# Patient Record
Sex: FEMALE | Race: WHITE | NOT HISPANIC OR LATINO | Employment: UNEMPLOYED | URBAN - METROPOLITAN AREA
[De-identification: names, ages, dates, MRNs, and addresses within clinical notes are randomized per-mention and may not be internally consistent; named-entity substitution may affect disease eponyms.]

---

## 2023-09-17 ENCOUNTER — OFFICE VISIT (OUTPATIENT)
Dept: URGENT CARE | Facility: CLINIC | Age: 23
End: 2023-09-17
Payer: COMMERCIAL

## 2023-09-17 VITALS
TEMPERATURE: 97.2 F | HEIGHT: 63 IN | SYSTOLIC BLOOD PRESSURE: 118 MMHG | RESPIRATION RATE: 15 BRPM | HEART RATE: 99 BPM | BODY MASS INDEX: 26.58 KG/M2 | WEIGHT: 150 LBS | OXYGEN SATURATION: 99 % | DIASTOLIC BLOOD PRESSURE: 68 MMHG

## 2023-09-17 DIAGNOSIS — H65.01 RIGHT ACUTE SEROUS OTITIS MEDIA, RECURRENCE NOT SPECIFIED: ICD-10-CM

## 2023-09-17 DIAGNOSIS — H60.331 ACUTE SWIMMER'S EAR OF RIGHT SIDE: Primary | ICD-10-CM

## 2023-09-17 PROCEDURE — G0382 LEV 3 HOSP TYPE B ED VISIT: HCPCS | Performed by: NURSE PRACTITIONER

## 2023-09-17 RX ORDER — CIPROFLOXACIN AND DEXAMETHASONE 3; 1 MG/ML; MG/ML
4 SUSPENSION/ DROPS AURICULAR (OTIC) 2 TIMES DAILY
Qty: 7.5 ML | Refills: 0 | Status: SHIPPED | OUTPATIENT
Start: 2023-09-17

## 2023-09-17 RX ORDER — RIMEGEPANT SULFATE 75 MG/75MG
TABLET, ORALLY DISINTEGRATING ORAL
COMMUNITY
Start: 2023-08-16

## 2023-09-17 NOTE — PROGRESS NOTES
[facility] Now        NAME: Radha Paul is a 25 y.o. female  : 2000    MRN: [id number]  DATE: 2023  TIME: 3:13 PM    Assessment and Plan   Acute swimmer's ear of right side [H60.331]  1. Acute swimmer's ear of right side  ciprofloxacin-dexamethasone (CIPRODEX) otic suspension      2. Right acute serous otitis media, recurrence not specified  Ambulatory Referral to Otolaryngology            Patient Instructions     --Instill Rx drops into right ear as prescribed x 5-7 days  --Keep water out of ear  --OTC nasal steroid (Flonase, Nasocort) 2 sprays/nostril daily x 3-5 days  --Motrin as needed  --Follow-up with ENT for ongoing/recurrent issues. Should be seen sooner for any worsening (ER if needed). Chief Complaint     Chief Complaint   Patient presents with   • Earache     Pt was already treated for ear infection by 2 different ab's pt is not sure what is happening she reports it feels like she is having a spasm in her right ear. History of Present Illness       Here with complaints of right ear discomfort x 3 weeks. Feels like "spasm", "fluid" in ear. Mild itching. Hearing normal.  Some initial pain in right upper tooth also. Went to PCP near start of symptoms and diagnosed with middle ear infection. Placed on Z-pack, no drops. Partial improvement. Additional antibiotic (Ceftin) given which she completed. Tooth pain resolved, but right ear still bothering her. No swelling noted. No fever/chills, URI symptoms. Initial headache now better. Has appointment with dentist in a couple days. Review of Systems   Review of Systems   Constitutional: Negative for fever. HENT: Positive for ear pain. Negative for ear discharge, facial swelling, hearing loss, rhinorrhea, sinus pressure and sinus pain. Respiratory: Negative for cough. Neurological: Positive for headaches.          Current Medications       Current Outpatient Medications:   • ciprofloxacin-dexamethasone (CIPRODEX) otic suspension, Administer 4 drops to the right ear 2 (two) times a day, Disp: 7.5 mL, Rfl: 0  •  Nurtec 75 MG TBDP, PLACE 1 TABLET BY TRANSLINGUAL ROUTE ON TOP OF TONGUE, ALLOW TO DISSOLVE THEN SWALLOW ONCE AS NEEDED FOR MIGRAINE; MAX 1 DOSE/24 HRS, Disp: , Rfl:     Current Allergies     Allergies as of 09/17/2023 - Reviewed 09/17/2023   Allergen Reaction Noted   • Amoxicillin Hives 09/17/2023   • Cephalosporins Hives 09/17/2023            The following portions of the patient's history were reviewed and updated as appropriate: allergies, current medications, past family history, past medical history, past social history, past surgical history and problem list.     No past medical history on file. No past surgical history on file. No family history on file. Medications have been verified. Objective   /68   Pulse 99   Temp (!) 97.2 °F (36.2 °C)   Resp 15   Ht 5' 3" (1.6 m)   Wt 68 kg (150 lb)   SpO2 99%   BMI 26.57 kg/m²   No LMP recorded. Physical Exam     Physical Exam  Constitutional:       General: She is not in acute distress. Appearance: She is not diaphoretic. HENT:      Head: Normocephalic. Right Ear: There is no impacted cerumen. Left Ear: Tympanic membrane, ear canal and external ear normal. There is no impacted cerumen. Ears:      Comments: Right canal with mild erythema, swelling, no otorrhea. TM dull grey, with mild bulge. Intact. No tragus, mastoid, pinna  tenderness. Nose: No congestion or rhinorrhea. Comments: No TMJ or sinus tenderness. Mouth/Throat:      Pharynx: No posterior oropharyngeal erythema. Comments: Teeth and gums, non-tender with normal appearance externally. Pulmonary:      Effort: Pulmonary effort is normal.   Neurological:      Mental Status: She is alert.    Psychiatric:         Mood and Affect: Mood normal.

## 2023-09-17 NOTE — PATIENT INSTRUCTIONS
--Instill Rx drops into right ear as prescribed x 5-7 days  --Keep water out of ear  --OTC nasal steroid (Flonase, Nasocort) 2 sprays/nostril daily x 3-5 days  --Motrin as needed  --Follow-up with ENT for ongoing/recurrent issues. Should be seen sooner for any worsening (ER if needed).

## 2023-11-17 ENCOUNTER — OFFICE VISIT (OUTPATIENT)
Dept: URGENT CARE | Facility: CLINIC | Age: 23
End: 2023-11-17
Payer: COMMERCIAL

## 2023-11-17 VITALS
OXYGEN SATURATION: 99 % | SYSTOLIC BLOOD PRESSURE: 128 MMHG | HEART RATE: 102 BPM | TEMPERATURE: 98.8 F | HEIGHT: 64 IN | DIASTOLIC BLOOD PRESSURE: 81 MMHG | BODY MASS INDEX: 27.66 KG/M2 | RESPIRATION RATE: 18 BRPM | WEIGHT: 162 LBS

## 2023-11-17 DIAGNOSIS — J20.9 ACUTE BRONCHITIS, UNSPECIFIED ORGANISM: Primary | ICD-10-CM

## 2023-11-17 PROCEDURE — G0382 LEV 3 HOSP TYPE B ED VISIT: HCPCS | Performed by: NURSE PRACTITIONER

## 2023-11-17 RX ORDER — AZITHROMYCIN 250 MG/1
TABLET, FILM COATED ORAL
Qty: 6 TABLET | Refills: 0 | Status: SHIPPED | OUTPATIENT
Start: 2023-11-17 | End: 2023-11-22

## 2023-11-17 NOTE — LETTER
November 17, 2023     Patient: Holden Alejandra   YOB: 2000   Date of Visit: 11/17/2023       To Whom it May Concern:    Rita Leone was seen in my clinic on 11/17/2023. Please excuse from work today due to illness and doctor's visit. If you have any questions or concerns, please don't hesitate to call.          Sincerely,          AGATA Valente        CC: No Recipients

## 2023-11-17 NOTE — PROGRESS NOTES
[facility] Now        NAME: Radha Paul is a 26 y.o. female  : 2000    MRN: [id number]  DATE: 2023  TIME: 11:30 AM    Assessment and Plan   Acute bronchitis, unspecified organism [J20.9]  1. Acute bronchitis, unspecified organism  azithromycin (ZITHROMAX) 250 mg tablet            Patient Instructions     --Rest, drink plenty of fluids  --Fill and start antibiotic only if no improvement/worsening symptoms over the next 48-72 hours despite other measures below  --Consider vitamin C, zinc, quercetin, and vitamin D to help strengthen your immune system  --For cough, you can take an OTC expectorant such as plain Robitussion or Mucinex (active ingredient guaifenesin). A spoonful of honey at bedtime may also be helpful, as may a prescription cough medicine. Also recommended is the use of a cool mist humidifier (with or without Vicks) in the bedroom at night. --For sore throat, you can take OTC lozenges, use warm gargles (salt water or apple cider vinegar and honey), herbal teas, or an OTC throat spray (Chloraseptic). --For nasal/sinus congestion, helpful measures include steam, warm compresses, an OTC saline nasal spray or Neti pot, or an OTC decongestant (such as Sudafed). The decongestant should be avoided, however, if you are under 10years of age, or have a history of high blood pressure or heart disease. In addition, an OTC nasal steroid (Flonase, Nasocort) or nasal decongestant (Afrin, Jhonatan-synephrine) may be taken. The nasal steroid should be used at bedtime, after the saline nasal spray. The nasal decongestant should not be taken more than 3 days consecutively in order to prevent rebound congestion. --For nasal drainage, postnasal drip, sneezing and itching, an OTC antihistamine (Allegra, Benadryl, etc) can be taken. --You can take Tylenol or Motrin/Advil as needed for fever, headache, body aches.  Motrin/Advil should be avoided, however, if you have a history of heart disease, bleeding ulcers, or if you take blood thinners. --You should contact your primary care provider and/or go to the ER if your symptoms are not improved or get worse over the next 7 days. This includes new onset fever, localized ear pain, sinus pain, as well as worsening cough, chest pain, shortness of breath, or significant weakness/fatigue. Chief Complaint     Chief Complaint   Patient presents with    Cold Like Symptoms     URI s/s x 1.5 weeks           History of Present Illness       Here with complaints of nasal congestion, scant rhinorrhea, cough productive of green sputum x 10 days. Not getting better, worse if anything. No sore throat, but glands feel tender at times. Ear pressure. No fever. Some headaches. No body aches. Nausea at times, but no vomiting, diarrhea. No dyspnea. Taking Dayquil and Nyquil. No COVID concerns. Denies pregnancy        Review of Systems   Review of Systems   Constitutional:  Negative for fever. HENT:  Positive for rhinorrhea. Negative for sore throat. Respiratory:  Positive for cough. Negative for shortness of breath and wheezing. Gastrointestinal:  Positive for nausea. Negative for abdominal pain and vomiting. Musculoskeletal:  Negative for myalgias.          Current Medications       Current Outpatient Medications:     azithromycin (ZITHROMAX) 250 mg tablet, Take 2 tablets today then 1 tablet daily x 4 days, Disp: 6 tablet, Rfl: 0    ciprofloxacin-dexamethasone (CIPRODEX) otic suspension, Administer 4 drops to the right ear 2 (two) times a day, Disp: 7.5 mL, Rfl: 0    Nurtec 75 MG TBDP, PLACE 1 TABLET BY TRANSLINGUAL ROUTE ON TOP OF TONGUE, ALLOW TO DISSOLVE THEN SWALLOW ONCE AS NEEDED FOR MIGRAINE; MAX 1 DOSE/24 HRS, Disp: , Rfl:     Current Allergies     Allergies as of 11/17/2023 - Reviewed 11/17/2023   Allergen Reaction Noted    Amoxicillin Hives 09/17/2023    Cephalosporins Hives 09/17/2023            The following portions of the patient's history were reviewed and updated as appropriate: allergies, current medications, past family history, past medical history, past social history, past surgical history and problem list.     History reviewed. No pertinent past medical history. History reviewed. No pertinent surgical history. History reviewed. No pertinent family history. Medications have been verified. Objective   /81   Pulse 102   Temp 98.8 °F (37.1 °C)   Resp 18   Ht 5' 4" (1.626 m)   Wt 73.5 kg (162 lb)   SpO2 99%   BMI 27.81 kg/m²   No LMP recorded. Physical Exam     Physical Exam  Constitutional:       General: She is not in acute distress. Appearance: Normal appearance. She is well-developed. She is not ill-appearing, toxic-appearing or diaphoretic. HENT:      Head: Normocephalic. Right Ear: Tympanic membrane, ear canal and external ear normal.      Left Ear: Tympanic membrane, ear canal and external ear normal.      Nose: Congestion and rhinorrhea present. Comments: No sinus tenderness. Mouth/Throat:      Pharynx: No oropharyngeal exudate or posterior oropharyngeal erythema. Eyes:      General:         Right eye: No discharge. Left eye: No discharge. Cardiovascular:      Rate and Rhythm: Normal rate and regular rhythm. Heart sounds: Normal heart sounds. No murmur heard. Pulmonary:      Effort: Pulmonary effort is normal. No respiratory distress. Breath sounds: Normal breath sounds. No stridor. No wheezing, rhonchi or rales. Chest:      Chest wall: No tenderness. Abdominal:      Tenderness: There is no abdominal tenderness. Musculoskeletal:      Cervical back: Neck supple. Lymphadenopathy:      Cervical: No cervical adenopathy. Skin:     General: Skin is warm and dry. Neurological:      Mental Status: She is alert and oriented to person, place, and time. Deep Tendon Reflexes: Reflexes are normal and symmetric.    Psychiatric: Mood and Affect: Mood normal.

## 2023-11-17 NOTE — PATIENT INSTRUCTIONS
--Rest, drink plenty of fluids  --Fill and start antibiotic only if no improvement/worsening symptoms over the next 48-72 hours despite other measures below  --Consider vitamin C, zinc, quercetin, and vitamin D to help strengthen your immune system  --For cough, you can take an OTC expectorant such as plain Robitussion or Mucinex (active ingredient guaifenesin). A spoonful of honey at bedtime may also be helpful, as may a prescription cough medicine. Also recommended is the use of a cool mist humidifier (with or without Vicks) in the bedroom at night. --For sore throat, you can take OTC lozenges, use warm gargles (salt water or apple cider vinegar and honey), herbal teas, or an OTC throat spray (Chloraseptic). --For nasal/sinus congestion, helpful measures include steam, warm compresses, an OTC saline nasal spray or Neti pot, or an OTC decongestant (such as Sudafed). The decongestant should be avoided, however, if you are under 10years of age, or have a history of high blood pressure or heart disease. In addition, an OTC nasal steroid (Flonase, Nasocort) or nasal decongestant (Afrin, Jhonatan-synephrine) may be taken. The nasal steroid should be used at bedtime, after the saline nasal spray. The nasal decongestant should not be taken more than 3 days consecutively in order to prevent rebound congestion. --For nasal drainage, postnasal drip, sneezing and itching, an OTC antihistamine (Allegra, Benadryl, etc) can be taken. --You can take Tylenol or Motrin/Advil as needed for fever, headache, body aches. Motrin/Advil should be avoided, however, if you have a history of heart disease, bleeding ulcers, or if you take blood thinners. --You should contact your primary care provider and/or go to the ER if your symptoms are not improved or get worse over the next 7 days.   This includes new onset fever, localized ear pain, sinus pain, as well as worsening cough, chest pain, shortness of breath, or significant weakness/fatigue.

## 2025-01-31 ENCOUNTER — OFFICE VISIT (OUTPATIENT)
Dept: OBGYN CLINIC | Facility: CLINIC | Age: 25
End: 2025-01-31
Payer: COMMERCIAL

## 2025-01-31 ENCOUNTER — APPOINTMENT (OUTPATIENT)
Dept: LAB | Facility: CLINIC | Age: 25
End: 2025-01-31
Payer: COMMERCIAL

## 2025-01-31 VITALS
WEIGHT: 168 LBS | HEIGHT: 64 IN | SYSTOLIC BLOOD PRESSURE: 112 MMHG | DIASTOLIC BLOOD PRESSURE: 80 MMHG | BODY MASS INDEX: 28.68 KG/M2

## 2025-01-31 DIAGNOSIS — Z12.4 SCREENING FOR MALIGNANT NEOPLASM OF CERVIX: ICD-10-CM

## 2025-01-31 DIAGNOSIS — Z87.42 HISTORY OF OVARIAN CYST: ICD-10-CM

## 2025-01-31 DIAGNOSIS — Z12.4 CERVICAL CANCER SCREENING: ICD-10-CM

## 2025-01-31 DIAGNOSIS — Z01.419 ENCOUNTER FOR WELL WOMAN EXAM: Primary | ICD-10-CM

## 2025-01-31 DIAGNOSIS — Z01.419 WELL WOMAN EXAM WITH ROUTINE GYNECOLOGICAL EXAM: ICD-10-CM

## 2025-01-31 DIAGNOSIS — Z12.39 ENCOUNTER FOR SCREENING BREAST EXAMINATION: ICD-10-CM

## 2025-01-31 DIAGNOSIS — N92.6 IRREGULAR BLEEDING: ICD-10-CM

## 2025-01-31 LAB
ALBUMIN SERPL BCG-MCNC: 4.2 G/DL (ref 3.5–5)
ALP SERPL-CCNC: 71 U/L (ref 34–104)
ALT SERPL W P-5'-P-CCNC: 31 U/L (ref 7–52)
ANION GAP SERPL CALCULATED.3IONS-SCNC: 5 MMOL/L (ref 4–13)
AST SERPL W P-5'-P-CCNC: 25 U/L (ref 13–39)
B-HCG SERPL-ACNC: <0.6 MIU/ML (ref 0–5)
BASOPHILS # BLD AUTO: 0.03 THOUSANDS/ΜL (ref 0–0.1)
BASOPHILS NFR BLD AUTO: 0 % (ref 0–1)
BILIRUB SERPL-MCNC: 1.17 MG/DL (ref 0.2–1)
BUN SERPL-MCNC: 15 MG/DL (ref 5–25)
CALCIUM SERPL-MCNC: 9.4 MG/DL (ref 8.4–10.2)
CHLORIDE SERPL-SCNC: 102 MMOL/L (ref 96–108)
CO2 SERPL-SCNC: 29 MMOL/L (ref 21–32)
CREAT SERPL-MCNC: 0.7 MG/DL (ref 0.6–1.3)
EOSINOPHIL # BLD AUTO: 0.13 THOUSAND/ΜL (ref 0–0.61)
EOSINOPHIL NFR BLD AUTO: 2 % (ref 0–6)
ERYTHROCYTE [DISTWIDTH] IN BLOOD BY AUTOMATED COUNT: 12.1 % (ref 11.6–15.1)
FSH SERPL-ACNC: 6.7 MIU/ML
GFR SERPL CREATININE-BSD FRML MDRD: 122 ML/MIN/1.73SQ M
GLUCOSE P FAST SERPL-MCNC: 87 MG/DL (ref 65–99)
HCT VFR BLD AUTO: 40.8 % (ref 34.8–46.1)
HGB BLD-MCNC: 13.4 G/DL (ref 11.5–15.4)
IMM GRANULOCYTES # BLD AUTO: 0.02 THOUSAND/UL (ref 0–0.2)
IMM GRANULOCYTES NFR BLD AUTO: 0 % (ref 0–2)
LH SERPL-ACNC: 4.6 MIU/ML
LYMPHOCYTES # BLD AUTO: 2.77 THOUSANDS/ΜL (ref 0.6–4.47)
LYMPHOCYTES NFR BLD AUTO: 37 % (ref 14–44)
MCH RBC QN AUTO: 30 PG (ref 26.8–34.3)
MCHC RBC AUTO-ENTMCNC: 32.8 G/DL (ref 31.4–37.4)
MCV RBC AUTO: 92 FL (ref 82–98)
MONOCYTES # BLD AUTO: 0.88 THOUSAND/ΜL (ref 0.17–1.22)
MONOCYTES NFR BLD AUTO: 12 % (ref 4–12)
NEUTROPHILS # BLD AUTO: 3.76 THOUSANDS/ΜL (ref 1.85–7.62)
NEUTS SEG NFR BLD AUTO: 49 % (ref 43–75)
NRBC BLD AUTO-RTO: 0 /100 WBCS
PLATELET # BLD AUTO: 348 THOUSANDS/UL (ref 149–390)
PMV BLD AUTO: 10 FL (ref 8.9–12.7)
POTASSIUM SERPL-SCNC: 4 MMOL/L (ref 3.5–5.3)
PROT SERPL-MCNC: 6.6 G/DL (ref 6.4–8.4)
RBC # BLD AUTO: 4.46 MILLION/UL (ref 3.81–5.12)
SODIUM SERPL-SCNC: 136 MMOL/L (ref 135–147)
TSH SERPL DL<=0.05 MIU/L-ACNC: 0.96 UIU/ML (ref 0.45–4.5)
WBC # BLD AUTO: 7.59 THOUSAND/UL (ref 4.31–10.16)

## 2025-01-31 PROCEDURE — 83001 ASSAY OF GONADOTROPIN (FSH): CPT

## 2025-01-31 PROCEDURE — 36415 COLL VENOUS BLD VENIPUNCTURE: CPT

## 2025-01-31 PROCEDURE — G0124 SCREEN C/V THIN LAYER BY MD: HCPCS | Performed by: PATHOLOGY

## 2025-01-31 PROCEDURE — 84702 CHORIONIC GONADOTROPIN TEST: CPT

## 2025-01-31 PROCEDURE — 80053 COMPREHEN METABOLIC PANEL: CPT

## 2025-01-31 PROCEDURE — 83002 ASSAY OF GONADOTROPIN (LH): CPT

## 2025-01-31 PROCEDURE — G0145 SCR C/V CYTO,THINLAYER,RESCR: HCPCS | Performed by: PATHOLOGY

## 2025-01-31 PROCEDURE — 85025 COMPLETE CBC W/AUTO DIFF WBC: CPT

## 2025-01-31 PROCEDURE — 84443 ASSAY THYROID STIM HORMONE: CPT

## 2025-01-31 PROCEDURE — 99385 PREV VISIT NEW AGE 18-39: CPT | Performed by: PHYSICIAN ASSISTANT

## 2025-01-31 RX ORDER — DROSPIRENONE AND ETHINYL ESTRADIOL 0.03MG-3MG
1 KIT ORAL DAILY
Qty: 90 TABLET | Refills: 4 | Status: SHIPPED | OUTPATIENT
Start: 2025-01-31

## 2025-01-31 RX ORDER — NIRMATRELVIR AND RITONAVIR 300-100 MG
3 KIT ORAL 2 TIMES DAILY
COMMUNITY
Start: 2025-01-15

## 2025-01-31 RX ORDER — NORETHINDRONE ACETATE AND ETHINYL ESTRADIOL .02; 1 MG/1; MG/1
1 TABLET ORAL DAILY
COMMUNITY
Start: 2024-08-30

## 2025-01-31 RX ORDER — NORETHINDRONE ACETATE AND ETHINYL ESTRADIOL AND FERROUS FUMARATE 1MG-20(21)
1 KIT ORAL DAILY
COMMUNITY
Start: 2024-12-30

## 2025-01-31 NOTE — PROGRESS NOTES
Name: Rita Leone      : 2000      MRN: [id number]  Encounter Provider: Doreen Frances PA-C  Encounter Date: 2025   Encounter department: [facility] TRAIL  :  Assessment & Plan  Encounter for well woman exam         Encounter for screening breast examination         Cervical cancer screening         Screening for malignant neoplasm of cervix    Orders:    Liquid-based pap, screening    Well woman exam with routine gynecological exam    Orders:    Liquid-based pap, screening    Irregular bleeding    Orders:    CBC and differential; Future    Comprehensive metabolic panel; Future    Follicle stimulating hormone; Future    hCG, quantitative; Future    Luteinizing hormone; Future    TSH, 3rd generation with Free T4 reflex; Future    drospirenone-ethinyl estradiol (RADHA) 3-0.03 MG per tablet; Take 1 tablet by mouth daily    History of ovarian cyst    Orders:    CBC and differential; Future    Comprehensive metabolic panel; Future    Follicle stimulating hormone; Future    hCG, quantitative; Future    Luteinizing hormone; Future    TSH, 3rd generation with Free T4 reflex; Future    drospirenone-ethinyl estradiol (RADHA) 3-0.03 MG per tablet; Take 1 tablet by mouth daily        History of Present Illness   Pt presents as a new patient for her annual exam today--  She has no complaints except increase in pelvic pain over the last several cycles--was seen in [facility] ER 24 for ovarian cyst  She has regular cycles on OCP--would like higher dose  Did DEPO in the past but had some issues with her teeth  Does not want to worry about weight gain as a side effect  Bowel and bladder are regular  Colonoscopy--  No breast concerns today    pap today.    Rx BW  Increase rx to RADHA  Nsaids prn  Daily mvi      Rita Leone is a 24 y.o. female who presents   History obtained from: patient    Review of Systems   Constitutional:  Negative for chills, fever and unexpected  weight change.   HENT:  Negative for ear pain and sore throat.    Eyes:  Negative for pain and visual disturbance.   Respiratory:  Negative for cough and shortness of breath.    Cardiovascular:  Negative for chest pain and palpitations.   Gastrointestinal:  Negative for abdominal pain, blood in stool, constipation, diarrhea and vomiting.   Genitourinary: Negative.  Negative for dysuria and hematuria.   Musculoskeletal:  Negative for arthralgias and back pain.   Skin:  Negative for color change and rash.   Neurological:  Negative for seizures and syncope.   All other systems reviewed and are negative.    Pertinent Medical History   Presents for annual      Medical History Reviewed by provider this encounter:       Past Medical History   Past Medical History:   Diagnosis Date    Chlamydia April 23    Migraine      History reviewed. No pertinent surgical history.  Family History   Problem Relation Age of Onset    Migraines Mother     Hypertension Father     Diabetes Paternal Grandmother       reports that she has never smoked. She has never been exposed to tobacco smoke. She has never used smokeless tobacco. She reports current alcohol use. She reports that she does not use drugs.  Current Outpatient Medications on File Prior to Visit   Medication Sig Dispense Refill    Nurtec 75 MG TBDP PLACE 1 TABLET BY TRANSLINGUAL ROUTE ON TOP OF TONGUE, ALLOW TO DISSOLVE THEN SWALLOW ONCE AS NEEDED FOR MIGRAINE; MAX 1 DOSE/24 HRS      Sodium Fluoride 1.1 % PSTE USE TWICE DAILY. NO EATING OR RINSING FOR 30 MINUTES AFTER.      Aurovela FE 1/20 1-20 MG-MCG per tablet Take 1 tablet by mouth daily (Patient not taking: Reported on 1/31/2025)      ciprofloxacin-dexamethasone (CIPRODEX) otic suspension Administer 4 drops to the right ear 2 (two) times a day 7.5 mL 0    norethindrone-ethinyl estradiol (Aurovela 1/20) 1-20 MG-MCG per tablet Take 1 tablet by mouth daily (Patient not taking: Reported on 1/31/2025)      Paxlovid, 300/100,  "tablet therapy pack Take 3 tablets by mouth 2 (two) times a day (Patient not taking: Reported on 1/31/2025)       No current facility-administered medications on file prior to visit.     Allergies   Allergen Reactions    Amoxicillin Hives    Cephalosporins Hives      Current Outpatient Medications on File Prior to Visit   Medication Sig Dispense Refill    Nurtec 75 MG TBDP PLACE 1 TABLET BY TRANSLINGUAL ROUTE ON TOP OF TONGUE, ALLOW TO DISSOLVE THEN SWALLOW ONCE AS NEEDED FOR MIGRAINE; MAX 1 DOSE/24 HRS      Sodium Fluoride 1.1 % PSTE USE TWICE DAILY. NO EATING OR RINSING FOR 30 MINUTES AFTER.      Aurovela FE 1/20 1-20 MG-MCG per tablet Take 1 tablet by mouth daily (Patient not taking: Reported on 1/31/2025)      ciprofloxacin-dexamethasone (CIPRODEX) otic suspension Administer 4 drops to the right ear 2 (two) times a day 7.5 mL 0    norethindrone-ethinyl estradiol (Aurovela 1/20) 1-20 MG-MCG per tablet Take 1 tablet by mouth daily (Patient not taking: Reported on 1/31/2025)      Paxlovid, 300/100, tablet therapy pack Take 3 tablets by mouth 2 (two) times a day (Patient not taking: Reported on 1/31/2025)       No current facility-administered medications on file prior to visit.      Social History     Tobacco Use    Smoking status: Never     Passive exposure: Never    Smokeless tobacco: Never   Vaping Use    Vaping status: Former    Quit date: 1/1/2020   Substance and Sexual Activity    Alcohol use: Yes     Comment: occas    Drug use: Never    Sexual activity: Yes     Partners: Male        Objective   /80 (BP Location: Left arm, Patient Position: Sitting, Cuff Size: Standard)   Ht 5' 4\" (1.626 m)   Wt 76.2 kg (168 lb)   LMP 01/28/2025 (Exact Date)   BMI 28.84 kg/m²      Physical Exam  Vitals and nursing note reviewed.   Constitutional:       General: She is not in acute distress.     Appearance: She is well-developed.   HENT:      Head: Normocephalic and atraumatic.   Eyes:      Conjunctiva/sclera: " Conjunctivae normal.   Cardiovascular:      Rate and Rhythm: Normal rate and regular rhythm.      Heart sounds: No murmur heard.  Pulmonary:      Effort: Pulmonary effort is normal. No respiratory distress.      Breath sounds: Normal breath sounds.   Abdominal:      Palpations: Abdomen is soft.      Tenderness: There is no abdominal tenderness.   Genitourinary:     General: Normal vulva.      Uterus: Normal.       Adnexa: Right adnexa normal and left adnexa normal.   Musculoskeletal:         General: No swelling.      Cervical back: Neck supple.   Skin:     General: Skin is warm and dry.      Capillary Refill: Capillary refill takes less than 2 seconds.   Neurological:      Mental Status: She is alert.   Psychiatric:         Mood and Affect: Mood normal.         Administrative Statements   I have spent a total time of 25 minutes in caring for this patient on the day of the visit/encounter including Counseling / Coordination of care. Topics discussed with the patient / family include medication review.

## 2025-02-06 ENCOUNTER — RESULTS FOLLOW-UP (OUTPATIENT)
Dept: OBGYN CLINIC | Facility: CLINIC | Age: 25
End: 2025-02-06

## 2025-02-06 LAB
LAB AP GYN PRIMARY INTERPRETATION: ABNORMAL
Lab: ABNORMAL
PATH INTERP SPEC-IMP: ABNORMAL

## 2025-03-05 ENCOUNTER — NURSE TRIAGE (OUTPATIENT)
Age: 25
End: 2025-03-05

## 2025-03-05 NOTE — TELEPHONE ENCOUNTER
"FOLLOW UP: In basket message to Doreen PARMAR regarding symptoms - patient also asking about lab results from 1/31.    REASON FOR CONVERSATION: Vaginal Bleeding    SYMPTOMS: BTB - Brown spotting x 2 weeks since last period on 2nd pack of OCP - switching from Depo. Mild 3/10 lower abdominal cramping. Passed dime sized clot which is concerning to her as she does not have clots with her periods.    OTHER: Advised ibuprofen, rest, hydrate. Review BTB normal with contraceptive change - continue to monitor.    DISPOSITION: Home Care      Reason for Disposition   Taking birth control pills and hasn't missed taking any pills    Answer Assessment - Initial Assessment Questions  1. BLEEDING SEVERITY: \"Describe the bleeding that you are having.\" \"How much bleeding is there?\"       Spotting and brown discharge - 1 dimes sized clot  2. ONSET: \"When did the bleeding begin?\" \"Is it continuing now?\"      On going intermittent brown spotting x 2 weeks  3. MENSTRUAL PERIOD: \"When was the last normal menstrual period?\" \"How is this different than your period?\"      LMP weeks ago  4. REGULARITY: \"How regular are your periods?\"      Irregular - coming of depo  5. ABDOMEN PAIN: \"Do you have any pain?\" \"How bad is the pain?\"  (e.g., Scale 0-10; none, mild, moderate, or severe)      3/10 cramping  6. PREGNANCY: \"Is there any chance you are pregnant?\" \"When was your last menstrual period?\"      Denies  7. BREASTFEEDING: \"Are you breastfeeding?\"      Denies  8. HORMONE MEDICINES: \"Are you taking any hormone medicines, prescription or over-the-counter?\" (e.g., birth control pills, estrogen)      OCP  9. BLOOD THINNER MEDICINES: \"Do you take any blood thinners?\" (e.g., Coumadin / warfarin, Pradaxa / dabigatran, aspirin)      Denies  10. CAUSE: \"What do you think is causing the bleeding?\" (e.g., recent gyn surgery, recent gyn procedure; known bleeding disorder, cervical cancer, polycystic ovarian disease, fibroids)          unsure  11. " "HEMODYNAMIC STATUS: \"Are you weak or feeling lightheaded?\" If Yes, ask: \"Can you stand and walk normally?\"         Denies  12. OTHER SYMPTOMS: \"What other symptoms are you having with the bleeding?\" (e.g., passed tissue, vaginal discharge, fever, menstrual-type cramps)        Denies fever, odor, burning, irritation, urinary concerns    Mild itching    Protocols used: Vaginal Bleeding - Abnormal-Adult-[place]    "

## 2025-03-06 ENCOUNTER — OFFICE VISIT (OUTPATIENT)
Dept: FAMILY MEDICINE CLINIC | Facility: CLINIC | Age: 25
End: 2025-03-06
Payer: COMMERCIAL

## 2025-03-06 ENCOUNTER — TELEPHONE (OUTPATIENT)
Age: 25
End: 2025-03-06

## 2025-03-06 VITALS
HEIGHT: 62 IN | BODY MASS INDEX: 29.63 KG/M2 | OXYGEN SATURATION: 99 % | DIASTOLIC BLOOD PRESSURE: 80 MMHG | HEART RATE: 100 BPM | RESPIRATION RATE: 18 BRPM | TEMPERATURE: 97.5 F | SYSTOLIC BLOOD PRESSURE: 122 MMHG | WEIGHT: 161 LBS

## 2025-03-06 DIAGNOSIS — Z00.00 ROUTINE ADULT HEALTH MAINTENANCE: Primary | ICD-10-CM

## 2025-03-06 PROCEDURE — 99385 PREV VISIT NEW AGE 18-39: CPT | Performed by: NURSE PRACTITIONER

## 2025-03-06 NOTE — PROGRESS NOTES
Name: Rita Leone      : 2000      MRN: [id number]  Encounter Provider: AGATA Bailon  Encounter Date: 3/6/2025   Encounter department: [facility]    Assessment & Plan  Routine adult health maintenance            Physical assessment unremarkable. VS were well controlled. Labs given is to complete for possible fu discussion. RTO as needed or for next scheduled appt. All questions answered.    History of Present Illness       Patient is here for routine health physical to establish care..  To review most recent labs.  To also discuss current state of health and any new problems that they may be experiencing.  Patient states that medications taken as prescribed and very well tolerated no new complaints at this time.          Review of Systems   Constitutional:  Negative for appetite change and fever.   HENT:  Negative for sinus pressure and sore throat.    Eyes:  Negative for pain.   Respiratory:  Negative for shortness of breath.    Cardiovascular:  Negative for chest pain.   Gastrointestinal:  Negative for abdominal pain.   Genitourinary:  Negative for dysuria.   Musculoskeletal:  Negative for arthralgias and myalgias.   Skin:  Negative for color change.   Neurological:  Negative for light-headedness.   Psychiatric/Behavioral:  Negative for behavioral problems.      Past Medical History:   Diagnosis Date    Chlamydia     Migraine      History reviewed. No pertinent surgical history.  Family History   Problem Relation Age of Onset    Migraines Mother     Hypertension Father     Diabetes Paternal Grandmother      Social History     Tobacco Use    Smoking status: Never     Passive exposure: Never    Smokeless tobacco: Never   Vaping Use    Vaping status: Former    Quit date: 2020   Substance and Sexual Activity    Alcohol use: Yes     Comment: occas    Drug use: Never    Sexual activity: Yes     Partners: Male     Current Outpatient Medications on File Prior to  "Visit   Medication Sig    drospirenone-ethinyl estradiol (RADHA) 3-0.03 MG per tablet Take 1 tablet by mouth daily    Nurtec 75 MG TBDP PLACE 1 TABLET BY TRANSLINGUAL ROUTE ON TOP OF TONGUE, ALLOW TO DISSOLVE THEN SWALLOW ONCE AS NEEDED FOR MIGRAINE; MAX 1 DOSE/24 HRS    Sodium Fluoride 1.1 % PSTE USE TWICE DAILY. NO EATING OR RINSING FOR 30 MINUTES AFTER.    Aurovela FE 1/20 1-20 MG-MCG per tablet Take 1 tablet by mouth daily (Patient not taking: Reported on 1/31/2025)    norethindrone-ethinyl estradiol (Aurovela 1/20) 1-20 MG-MCG per tablet Take 1 tablet by mouth daily (Patient not taking: Reported on 1/31/2025)    Paxlovid, 300/100, tablet therapy pack Take 3 tablets by mouth 2 (two) times a day (Patient not taking: Reported on 1/31/2025)    [DISCONTINUED] ciprofloxacin-dexamethasone (CIPRODEX) otic suspension Administer 4 drops to the right ear 2 (two) times a day     Allergies   Allergen Reactions    Amoxicillin Hives    Cephalosporins Hives     There is no immunization history for the selected administration types on file for this patient.  Objective   /80 (BP Location: Left arm, Patient Position: Sitting, Cuff Size: Standard)   Pulse 100   Temp 97.5 °F (36.4 °C) (Temporal)   Resp 18   Ht 5' 2.3\" (1.582 m)   Wt 73 kg (161 lb)   SpO2 99%   BMI 29.16 kg/m²     Physical Exam  Vitals and nursing note reviewed.   Constitutional:       Appearance: Normal appearance. She is normal weight.   HENT:      Head: Normocephalic and atraumatic.      Right Ear: Tympanic membrane, ear canal and external ear normal.      Left Ear: Tympanic membrane, ear canal and external ear normal.      Nose: Nose normal.      Mouth/Throat:      Mouth: Mucous membranes are moist.   Cardiovascular:      Rate and Rhythm: Normal rate and regular rhythm.      Pulses: Normal pulses.      Heart sounds: Normal heart sounds.   Pulmonary:      Effort: Pulmonary effort is normal.      Breath sounds: Normal breath sounds.   Abdominal:      " General: Abdomen is flat. Bowel sounds are normal.      Palpations: Abdomen is soft.   Musculoskeletal:         General: Normal range of motion.      Cervical back: Normal range of motion.   Neurological:      General: No focal deficit present.      Mental Status: She is oriented to person, place, and time.   Psychiatric:         Mood and Affect: Mood normal.         Behavior: Behavior normal.         Thought Content: Thought content normal.         Judgment: Judgment normal.

## 2025-03-06 NOTE — TELEPHONE ENCOUNTER
Patient returning call to Marycruz in the office.  Appt notes state patient need to reschedule and change PCP.  Called office clerical for assistance to explain to the patient.  Spoke with Marycruz and warm transferred patient to Marycruz for further assistance

## 2025-03-10 ENCOUNTER — TELEPHONE (OUTPATIENT)
Age: 25
End: 2025-03-10

## 2025-03-10 NOTE — TELEPHONE ENCOUNTER
Pt stated she had an appt on 3/6/25 with Gladys and stated that the stomach pain is getting worse. Please contact pt and advise what to do next. Thank you for your help.

## 2025-03-10 NOTE — TELEPHONE ENCOUNTER
Spoke with patient and she stated that the abdominal pain that she discussed with you at last visit is increasing in frequency(comes and goes). I advised pt to go ER needed (provider out of office). Pt did not feel that was needed a this point. She will be obtaining her labs tomorrow and made a sooner follow up appointment on 3/17/25 and put on wait list. If there is any further recommendations please contact pt.

## 2025-03-11 ENCOUNTER — APPOINTMENT (OUTPATIENT)
Dept: LAB | Facility: CLINIC | Age: 25
End: 2025-03-11
Payer: COMMERCIAL

## 2025-03-11 DIAGNOSIS — Z00.00 ROUTINE ADULT HEALTH MAINTENANCE: ICD-10-CM

## 2025-03-11 LAB
ALBUMIN SERPL BCG-MCNC: 4.1 G/DL (ref 3.5–5)
ALP SERPL-CCNC: 58 U/L (ref 34–104)
ALT SERPL W P-5'-P-CCNC: 13 U/L (ref 7–52)
ANION GAP SERPL CALCULATED.3IONS-SCNC: 6 MMOL/L (ref 4–13)
AST SERPL W P-5'-P-CCNC: 17 U/L (ref 13–39)
BACTERIA UR QL AUTO: NORMAL /HPF
BASOPHILS # BLD AUTO: 0.02 THOUSANDS/ÂΜL (ref 0–0.1)
BASOPHILS NFR BLD AUTO: 0 % (ref 0–1)
BILIRUB SERPL-MCNC: 1.51 MG/DL (ref 0.2–1)
BILIRUB UR QL STRIP: NEGATIVE
BUN SERPL-MCNC: 9 MG/DL (ref 5–25)
CALCIUM SERPL-MCNC: 9 MG/DL (ref 8.4–10.2)
CHLORIDE SERPL-SCNC: 104 MMOL/L (ref 96–108)
CHOLEST SERPL-MCNC: 181 MG/DL (ref ?–200)
CLARITY UR: CLEAR
CO2 SERPL-SCNC: 27 MMOL/L (ref 21–32)
COLOR UR: ABNORMAL
CREAT SERPL-MCNC: 0.7 MG/DL (ref 0.6–1.3)
EOSINOPHIL # BLD AUTO: 0.07 THOUSAND/ÂΜL (ref 0–0.61)
EOSINOPHIL NFR BLD AUTO: 1 % (ref 0–6)
ERYTHROCYTE [DISTWIDTH] IN BLOOD BY AUTOMATED COUNT: 12.2 % (ref 11.6–15.1)
GFR SERPL CREATININE-BSD FRML MDRD: 122 ML/MIN/1.73SQ M
GLUCOSE P FAST SERPL-MCNC: 87 MG/DL (ref 65–99)
GLUCOSE UR STRIP-MCNC: NEGATIVE MG/DL
HCT VFR BLD AUTO: 41.6 % (ref 34.8–46.1)
HDLC SERPL-MCNC: 63 MG/DL
HGB BLD-MCNC: 14 G/DL (ref 11.5–15.4)
HGB UR QL STRIP.AUTO: ABNORMAL
IMM GRANULOCYTES # BLD AUTO: 0.03 THOUSAND/UL (ref 0–0.2)
IMM GRANULOCYTES NFR BLD AUTO: 0 % (ref 0–2)
KETONES UR STRIP-MCNC: NEGATIVE MG/DL
LDLC SERPL CALC-MCNC: 94 MG/DL (ref 0–100)
LEUKOCYTE ESTERASE UR QL STRIP: NEGATIVE
LIPASE SERPL-CCNC: 20 U/L (ref 11–82)
LYMPHOCYTES # BLD AUTO: 2.74 THOUSANDS/ÂΜL (ref 0.6–4.47)
LYMPHOCYTES NFR BLD AUTO: 35 % (ref 14–44)
MAGNESIUM SERPL-MCNC: 2.1 MG/DL (ref 1.9–2.7)
MCH RBC QN AUTO: 30.6 PG (ref 26.8–34.3)
MCHC RBC AUTO-ENTMCNC: 33.7 G/DL (ref 31.4–37.4)
MCV RBC AUTO: 91 FL (ref 82–98)
MONOCYTES # BLD AUTO: 0.72 THOUSAND/ÂΜL (ref 0.17–1.22)
MONOCYTES NFR BLD AUTO: 9 % (ref 4–12)
NEUTROPHILS # BLD AUTO: 4.17 THOUSANDS/ÂΜL (ref 1.85–7.62)
NEUTS SEG NFR BLD AUTO: 55 % (ref 43–75)
NITRITE UR QL STRIP: NEGATIVE
NON-SQ EPI CELLS URNS QL MICRO: NORMAL /HPF
NONHDLC SERPL-MCNC: 118 MG/DL
NRBC BLD AUTO-RTO: 0 /100 WBCS
PH UR STRIP.AUTO: 7 [PH]
PLATELET # BLD AUTO: 323 THOUSANDS/UL (ref 149–390)
PMV BLD AUTO: 10.2 FL (ref 8.9–12.7)
POTASSIUM SERPL-SCNC: 3.7 MMOL/L (ref 3.5–5.3)
PROT SERPL-MCNC: 6.7 G/DL (ref 6.4–8.4)
PROT UR STRIP-MCNC: NEGATIVE MG/DL
RBC # BLD AUTO: 4.57 MILLION/UL (ref 3.81–5.12)
RBC #/AREA URNS AUTO: NORMAL /HPF
SODIUM SERPL-SCNC: 137 MMOL/L (ref 135–147)
SP GR UR STRIP.AUTO: 1.01 (ref 1–1.03)
TRIGL SERPL-MCNC: 119 MG/DL (ref ?–150)
UROBILINOGEN UR STRIP-ACNC: <2 MG/DL
WBC # BLD AUTO: 7.75 THOUSAND/UL (ref 4.31–10.16)
WBC #/AREA URNS AUTO: NORMAL /HPF

## 2025-03-11 PROCEDURE — 83690 ASSAY OF LIPASE: CPT

## 2025-03-11 PROCEDURE — 36415 COLL VENOUS BLD VENIPUNCTURE: CPT

## 2025-03-11 PROCEDURE — 81001 URINALYSIS AUTO W/SCOPE: CPT

## 2025-03-11 PROCEDURE — 80053 COMPREHEN METABOLIC PANEL: CPT

## 2025-03-11 PROCEDURE — 85025 COMPLETE CBC W/AUTO DIFF WBC: CPT

## 2025-03-11 PROCEDURE — 80061 LIPID PANEL: CPT

## 2025-03-11 PROCEDURE — 83735 ASSAY OF MAGNESIUM: CPT

## 2025-03-17 ENCOUNTER — OFFICE VISIT (OUTPATIENT)
Dept: FAMILY MEDICINE CLINIC | Facility: CLINIC | Age: 25
End: 2025-03-17
Payer: COMMERCIAL

## 2025-03-17 VITALS
RESPIRATION RATE: 16 BRPM | HEART RATE: 105 BPM | HEIGHT: 62 IN | TEMPERATURE: 97.7 F | WEIGHT: 161 LBS | BODY MASS INDEX: 29.63 KG/M2 | DIASTOLIC BLOOD PRESSURE: 72 MMHG | SYSTOLIC BLOOD PRESSURE: 104 MMHG | OXYGEN SATURATION: 99 %

## 2025-03-17 DIAGNOSIS — R10.11 RUQ PAIN: ICD-10-CM

## 2025-03-17 DIAGNOSIS — K82.8 BILIARY DYSKINESIA: Primary | ICD-10-CM

## 2025-03-17 PROCEDURE — 99213 OFFICE O/P EST LOW 20 MIN: CPT | Performed by: NURSE PRACTITIONER

## 2025-03-17 RX ORDER — ONDANSETRON 4 MG/1
4 TABLET, ORALLY DISINTEGRATING ORAL EVERY 8 HOURS PRN
COMMUNITY
Start: 2025-03-11 | End: 2025-03-21

## 2025-03-17 NOTE — PROGRESS NOTES
":  Assessment & Plan  Biliary dyskinesia    Orders:    NM Hepatobiliary w RX; Future    RUQ pain    Orders:    NM Hepatobiliary w RX; Future    Patient continues to have upper right quadrant discomfort.  Was seen recently in the emergency department at [facility] in ultrasound of the abdomen was uneventful.  Patient describes pain as right upper quadrant discomfort stabbing bloating more increasing gas.  More later in the afternoon.  Progressively worse.  None currently but does seem to be related to food.  Or postprandial.  Did advise gallbladder function study is warranted will contact her with results when available.  Until such time any acute severe unrelenting abdominal pain 10 out of 10 seek emergency care.    History of Present Illness     Rita Leone is a 24 y.o. female   (Follow up from the ER still having stomach issues.    Abdominal Pain      Review of Systems   Gastrointestinal:  Positive for abdominal pain (Right upper quadrant predominantly later in the afternoon and postprandial).     Objective   /72 (BP Location: Left arm, Patient Position: Sitting, Cuff Size: Standard)   Pulse 105   Temp 97.7 °F (36.5 °C) (Temporal)   Resp 16   Ht 5' 2.3\" (1.582 m)   Wt 73 kg (161 lb)   SpO2 99%   BMI 29.16 kg/m²      Physical Exam  Abdominal:      General: Bowel sounds are normal.      Tenderness: There is generalized abdominal tenderness and tenderness in the right upper quadrant.   Neurological:      Mental Status: She is alert.           "

## 2025-04-04 ENCOUNTER — HOSPITAL ENCOUNTER (OUTPATIENT)
Dept: NUCLEAR MEDICINE | Facility: HOSPITAL | Age: 25
Discharge: HOME | End: 2025-04-04
Payer: COMMERCIAL

## 2025-04-04 ENCOUNTER — RESULTS FOLLOW-UP (OUTPATIENT)
Dept: FAMILY MEDICINE CLINIC | Facility: CLINIC | Age: 25
End: 2025-04-04

## 2025-04-04 DIAGNOSIS — R10.11 RUQ PAIN: ICD-10-CM

## 2025-04-04 DIAGNOSIS — K82.8 BILIARY DYSKINESIA: ICD-10-CM

## 2025-04-04 PROCEDURE — A9537 TC99M MEBROFENIN: HCPCS

## 2025-04-04 PROCEDURE — 78227 HEPATOBIL SYST IMAGE W/DRUG: CPT

## 2025-04-04 RX ORDER — SINCALIDE 5 UG/5ML
0.02 INJECTION, POWDER, LYOPHILIZED, FOR SOLUTION INTRAVENOUS ONCE
Status: COMPLETED | OUTPATIENT
Start: 2025-04-04 | End: 2025-04-04

## 2025-04-04 RX ADMIN — SINCALIDE 1.5 MCG: 5 INJECTION, POWDER, LYOPHILIZED, FOR SOLUTION INTRAVENOUS at 09:00

## 2025-04-07 DIAGNOSIS — R10.13 EPIGASTRIC PAIN: Primary | ICD-10-CM

## 2025-06-30 ENCOUNTER — OFFICE VISIT (OUTPATIENT)
Dept: OBGYN CLINIC | Facility: CLINIC | Age: 25
End: 2025-06-30
Payer: COMMERCIAL

## 2025-06-30 VITALS
HEIGHT: 62 IN | SYSTOLIC BLOOD PRESSURE: 104 MMHG | BODY MASS INDEX: 30.07 KG/M2 | WEIGHT: 163.4 LBS | DIASTOLIC BLOOD PRESSURE: 72 MMHG

## 2025-06-30 DIAGNOSIS — Z30.41 ENCOUNTER FOR SURVEILLANCE OF CONTRACEPTIVE PILLS: Primary | ICD-10-CM

## 2025-06-30 DIAGNOSIS — N94.6 DYSMENORRHEA: ICD-10-CM

## 2025-06-30 PROCEDURE — 99213 OFFICE O/P EST LOW 20 MIN: CPT | Performed by: PHYSICIAN ASSISTANT

## 2025-06-30 RX ORDER — NORETHINDRONE ACETATE AND ETHINYL ESTRADIOL, ETHINYL ESTRADIOL AND FERROUS FUMARATE 1MG-10(24)
1 KIT ORAL DAILY
Qty: 84 TABLET | Refills: 0 | Status: SHIPPED | OUTPATIENT
Start: 2025-06-30

## 2025-06-30 NOTE — PROGRESS NOTES
"ASSESSMENT/PLAN:    Encounter Diagnosis     ICD-10-CM    1. Encounter for surveillance of contraceptive pills  Z30.41       2. Dysmenorrhea  N94.6 Norethin-Eth Estrad-Fe Biphas (Lo Loestrin Fe) 1 MG-10 MCG / 10 MCG TABS        - Discussed alternative contraceptive options  - Previously did well on Aurovela 1/20; discussed transitioning to loloestrin which is chemically the same, but at a lower dose. RX sent to pharmacy. Need to wait until onset of next menses to start. Take pill same time every day. Use backup protection x 4 weeks with starting new pill to prevent pregnancy.     SUBJECTIVE/HPI:      Patient ID: Rita Leone [id number]        Rita Leone is a 24 y.o.  presenting to the office to discuss birth control options. She was placed on Jen in January but has since stopped it due to feeling like a zombie. She has not been using any condoms since April. She is due for a period in 2 weeks. Her last episode of unprotected intercourse was yesterday.   Prior to JEN, she was on Aurovela 1/20 and Depo Provera injections in the past. She did well on those options. However, she needed a root canal on DEPO so she does not want to start that again      HISTORY:    Problem List[1]    Allergies[2]    Current Medications[3]    OB History          0    Para   0    Term   0       0    AB   0    Living   0         SAB   0    IAB   0    Ectopic   0    Multiple   0    Live Births   0           Obstetric Comments   Menarche 13               Past Medical History[4]     Past Surgical History[5]     Family History[6]     REVIEW OF SYSTEMS:  Review of Systems   All other systems reviewed and are negative.   - see hpi    OBJECTIVE:    Visit Vitals  /72 (BP Location: Left arm, Patient Position: Sitting, Cuff Size: Standard)   Ht 5' 2\" (1.575 m)   Wt 74.1 kg (163 lb 6.4 oz)   LMP 06/10/2025 (Approximate)   Breastfeeding No   BMI 29.89 kg/m²   OB Status Having periods   Smoking Status Never   BSA 1.75 " m²         Physical Exam  Constitutional:       General: She is not in acute distress.     Appearance: Normal appearance. She is not ill-appearing, toxic-appearing or diaphoretic.   HENT:      Head: Normocephalic and atraumatic.   Pulmonary:      Effort: Pulmonary effort is normal.     Neurological:      General: No focal deficit present.      Mental Status: She is alert.     Psychiatric:         Mood and Affect: Mood normal.         Behavior: Behavior normal.   Vitals reviewed.            [1] There is no problem list on file for this patient.   [2]   Allergies  Allergen Reactions    Amoxicillin Hives    Cephalosporins Hives   [3]   Current Outpatient Medications:     Norethin-Eth Estrad-Fe Biphas (Lo Loestrin Fe) 1 MG-10 MCG / 10 MCG TABS, Take 1 tablet by mouth in the morning, Disp: 84 tablet, Rfl: 0    Nurtec 75 MG TBDP, , Disp: , Rfl:     Sodium Fluoride 1.1 % PSTE, , Disp: , Rfl:     ondansetron (ZOFRAN-ODT) 4 mg disintegrating tablet, Take 4 mg by mouth every 8 (eight) hours as needed, Disp: , Rfl:   [4]   Past Medical History:  Diagnosis Date    Chlamydia April 23    Migraine     Ovarian cyst    [5] No past surgical history on file.  [6]   Family History  Problem Relation Name Age of Onset    Migraines Mother Khadar     Hypertension Father      Diabetes Paternal Grandmother Lorrie

## 2025-07-01 ENCOUNTER — TELEPHONE (OUTPATIENT)
Age: 25
End: 2025-07-01

## 2025-07-01 NOTE — TELEPHONE ENCOUNTER
Lvm for pt to call office. Pt called back I spoke to pt and told her that sara is changing the coding of the annual to a problem visit pt aware she will have to schedule a new annual at some point. Pt also aware she may be billed for her pap and that can't be changed as the pap was already resulted. Pt aware and understanding.

## 2025-07-01 NOTE — TELEPHONE ENCOUNTER
Pt was seen 06/30 with Chandni Pederson PA-C. Pt stated she was billed for her visit on 01/31 $268.00 and stated visit was not suppose to be for annual but to establish care and discuss BC. Pt was due in March for annual. Pt brought up billing issue at yesterday visit and was told to call. Pt aware of message sent.

## 2025-07-28 ENCOUNTER — TELEPHONE (OUTPATIENT)
Age: 25
End: 2025-07-28